# Patient Record
Sex: FEMALE | Race: WHITE | HISPANIC OR LATINO
[De-identification: names, ages, dates, MRNs, and addresses within clinical notes are randomized per-mention and may not be internally consistent; named-entity substitution may affect disease eponyms.]

---

## 2017-12-26 ENCOUNTER — RESULT REVIEW (OUTPATIENT)
Age: 47
End: 2017-12-26

## 2018-05-23 PROBLEM — Z00.00 ENCOUNTER FOR PREVENTIVE HEALTH EXAMINATION: Status: ACTIVE | Noted: 2018-05-23

## 2018-06-19 ENCOUNTER — APPOINTMENT (OUTPATIENT)
Dept: SURGERY | Facility: CLINIC | Age: 48
End: 2018-06-19
Payer: MEDICAID

## 2018-06-19 VITALS
BODY MASS INDEX: 26.68 KG/M2 | SYSTOLIC BLOOD PRESSURE: 127 MMHG | HEIGHT: 62 IN | WEIGHT: 145 LBS | DIASTOLIC BLOOD PRESSURE: 85 MMHG | OXYGEN SATURATION: 98 % | TEMPERATURE: 98.8 F | HEART RATE: 78 BPM

## 2018-06-19 PROCEDURE — 99204 OFFICE O/P NEW MOD 45 MIN: CPT

## 2018-07-10 ENCOUNTER — APPOINTMENT (OUTPATIENT)
Dept: SURGERY | Facility: CLINIC | Age: 48
End: 2018-07-10

## 2018-08-13 ENCOUNTER — APPOINTMENT (OUTPATIENT)
Dept: SURGERY | Facility: CLINIC | Age: 48
End: 2018-08-13

## 2018-12-19 ENCOUNTER — RECORD ABSTRACTING (OUTPATIENT)
Age: 48
End: 2018-12-19

## 2018-12-19 DIAGNOSIS — Z30.9 ENCOUNTER FOR CONTRACEPTIVE MANAGEMENT, UNSPECIFIED: ICD-10-CM

## 2018-12-19 DIAGNOSIS — N63.0 UNSPECIFIED LUMP IN UNSPECIFIED BREAST: ICD-10-CM

## 2018-12-19 DIAGNOSIS — Z82.49 FAMILY HISTORY OF ISCHEMIC HEART DISEASE AND OTHER DISEASES OF THE CIRCULATORY SYSTEM: ICD-10-CM

## 2018-12-19 DIAGNOSIS — Z78.9 OTHER SPECIFIED HEALTH STATUS: ICD-10-CM

## 2018-12-19 DIAGNOSIS — F17.200 NICOTINE DEPENDENCE, UNSPECIFIED, UNCOMPLICATED: ICD-10-CM

## 2018-12-19 LAB — CYTOLOGY CVX/VAG DOC THIN PREP: NORMAL

## 2018-12-19 RX ORDER — NICOTINE POLACRILEX 4 MG
250 GUM BUCCAL
Refills: 0 | Status: ACTIVE | COMMUNITY

## 2018-12-19 RX ORDER — UBIDECARENONE 200 MG
500 CAPSULE ORAL
Refills: 0 | Status: ACTIVE | COMMUNITY

## 2019-01-08 ENCOUNTER — APPOINTMENT (OUTPATIENT)
Dept: OBGYN | Facility: CLINIC | Age: 49
End: 2019-01-08

## 2019-02-11 ENCOUNTER — APPOINTMENT (OUTPATIENT)
Dept: OBGYN | Facility: CLINIC | Age: 49
End: 2019-02-11
Payer: MEDICAID

## 2019-02-11 VITALS
BODY MASS INDEX: 27.42 KG/M2 | SYSTOLIC BLOOD PRESSURE: 112 MMHG | WEIGHT: 149 LBS | HEIGHT: 62 IN | DIASTOLIC BLOOD PRESSURE: 78 MMHG

## 2019-02-11 DIAGNOSIS — N92.1 EXCESSIVE AND FREQUENT MENSTRUATION WITH IRREGULAR CYCLE: ICD-10-CM

## 2019-02-11 DIAGNOSIS — R92.8 OTHER ABNORMAL AND INCONCLUSIVE FINDINGS ON DIAGNOSTIC IMAGING OF BREAST: ICD-10-CM

## 2019-02-11 LAB
BILIRUB UR QL STRIP: NORMAL
CLARITY UR: CLEAR
COLLECTION METHOD: NORMAL
GLUCOSE UR-MCNC: NORMAL
HCG UR QL: 0.2 EU/DL
HGB UR QL STRIP.AUTO: NORMAL
KETONES UR-MCNC: NORMAL
LEUKOCYTE ESTERASE UR QL STRIP: NORMAL
NITRITE UR QL STRIP: NORMAL
PH UR STRIP: 5.5
PROT UR STRIP-MCNC: NORMAL
SP GR UR STRIP: 1.01

## 2019-02-11 PROCEDURE — 36415 COLL VENOUS BLD VENIPUNCTURE: CPT

## 2019-02-11 PROCEDURE — 99396 PREV VISIT EST AGE 40-64: CPT

## 2019-02-11 PROCEDURE — 81003 URINALYSIS AUTO W/O SCOPE: CPT | Mod: QW

## 2019-02-11 NOTE — PHYSICAL EXAM
[Awake] : awake [Alert] : alert [Acute Distress] : no acute distress [Mass] : no breast mass [Nipple Discharge] : no nipple discharge [Axillary LAD] : no axillary lymphadenopathy [Soft] : soft [Tender] : non tender [Distended] : not distended [Oriented x3] : oriented to person, place, and time [Depressed Mood] : not depressed [Flat Affect] : affect not flat [Normal] : uterus [IUD String] : had an IUD string protruding out

## 2019-02-11 NOTE — REVIEW OF SYSTEMS
[Nl] : Integumentary [Fever] : no fever [Chills] : no chills [Chest Pain] : no chest pain [Dyspnea] : no shortness of breath [Abdominal Pain] : no abdominal pain [Abn Vag Bleeding] : no abnormal vaginal bleeding

## 2019-02-11 NOTE — HISTORY OF PRESENT ILLNESS
[1 Year Ago] : 1 year ago [Last Pap ___] : Last cervical pap smear was [unfilled] [Last Pap Smear ___] : last Papanicolaou cytology done [unfilled] [Last Mammogram ___] : last mammogram done [unfilled] [Definite:  ___ (Date)] : the last menstrual period was [unfilled] [Menarche Age: ____] : age at menarche was [unfilled] [Sexually Active] : is sexually active [Contraception] : uses contraception [IUD] : has an intrauterine device [Male ___] : [unfilled] male [No] : no

## 2019-02-13 LAB
FSH SERPL-MCNC: 32.5 IU/L
HPV HIGH+LOW RISK DNA PNL CVX: NOT DETECTED

## 2019-02-14 LAB — CYTOLOGY CVX/VAG DOC THIN PREP: NORMAL

## 2019-12-20 ENCOUNTER — APPOINTMENT (OUTPATIENT)
Dept: OBGYN | Facility: CLINIC | Age: 49
End: 2019-12-20
Payer: MEDICAID

## 2019-12-20 VITALS
HEIGHT: 62 IN | DIASTOLIC BLOOD PRESSURE: 70 MMHG | WEIGHT: 148 LBS | SYSTOLIC BLOOD PRESSURE: 122 MMHG | BODY MASS INDEX: 27.23 KG/M2

## 2019-12-20 DIAGNOSIS — Z97.5 PRESENCE OF (INTRAUTERINE) CONTRACEPTIVE DEVICE: ICD-10-CM

## 2019-12-20 LAB
HCG UR QL: NEGATIVE
QUALITY CONTROL: YES

## 2019-12-20 PROCEDURE — 58301 REMOVE INTRAUTERINE DEVICE: CPT

## 2019-12-20 PROCEDURE — 81025 URINE PREGNANCY TEST: CPT

## 2019-12-20 NOTE — PROCEDURE
[IUD Removal] : IUD [Paraguard] : Bran [ IUD] :  IUD [Speculum Placed] : a speculum was placed in the vagina [Strings Exposed - Forceps] : forceps were placed in the endocervical canal to expose the strings [IUD Discarded] : discarded [Tolerated Well] : the patient tolerated the procedure well [No Complications] : none

## 2019-12-20 NOTE — HISTORY OF PRESENT ILLNESS
[Last Mammogram ___] : Last Mammogram was [unfilled] [Last Pap ___] : Last cervical pap smear was [unfilled] [Perimenopausal] : is perimenopausal [unknown] : the patient is unsure of the date of her LMP [Menarche Age: ____] : age at menarche was [unfilled] [Sexually Active] : is sexually active [Male ___] : [unfilled] male [NA] : N/A [de-identified] : ELENA 7/9/2018 - BR 2 [Contraception] : does not use contraception

## 2019-12-20 NOTE — REVIEW OF SYSTEMS
[Nl] : Hematologic/Lymphatic [Pelvic Pain] : no pelvic pain [Abn Vag Bleeding] : no abnormal vaginal bleeding

## 2020-03-12 ENCOUNTER — APPOINTMENT (OUTPATIENT)
Dept: OBGYN | Facility: CLINIC | Age: 50
End: 2020-03-12

## 2023-01-31 ENCOUNTER — OFFICE (OUTPATIENT)
Dept: URBAN - METROPOLITAN AREA CLINIC 103 | Facility: CLINIC | Age: 53
Setting detail: OPHTHALMOLOGY
End: 2023-01-31
Payer: COMMERCIAL

## 2023-01-31 DIAGNOSIS — H52.7: ICD-10-CM

## 2023-01-31 DIAGNOSIS — H25.13: ICD-10-CM

## 2023-01-31 DIAGNOSIS — H40.033: ICD-10-CM

## 2023-01-31 PROCEDURE — 92020 GONIOSCOPY: CPT | Performed by: OPHTHALMOLOGY

## 2023-01-31 PROCEDURE — 92015 DETERMINE REFRACTIVE STATE: CPT | Performed by: OPHTHALMOLOGY

## 2023-01-31 PROCEDURE — 92014 COMPRE OPH EXAM EST PT 1/>: CPT | Performed by: OPHTHALMOLOGY

## 2023-01-31 ASSESSMENT — REFRACTION_CURRENTRX
OD_AXIS: 165
OD_VPRISM_DIRECTION: PROGS
OS_ADD: +2.75
OS_OVR_VA: 20/
OD_CYLINDER: -1.00
OS_AXIS: 034
OD_OVR_VA: 20/
OS_AXIS: 026
OS_ADD: +2.50
OS_SPHERE: +1.75
OD_ADD: +2.50
OS_CYLINDER: -1.00
OS_CYLINDER: -1.25
OD_ADD: +2.75
OD_CYLINDER: -1.00
OD_OVR_VA: 20/
OS_OVR_VA: 20/
OS_VPRISM_DIRECTION: PROGS
OS_SPHERE: +1.75
OD_SPHERE: +1.75
OD_SPHERE: +1.75
OD_AXIS: 168

## 2023-01-31 ASSESSMENT — REFRACTION_AUTOREFRACTION
OD_SPHERE: +1.75
OD_CYLINDER: -0.75
OS_SPHERE: +2.00
OS_CYLINDER: -1.00
OD_AXIS: 158
OS_AXIS: 023

## 2023-01-31 ASSESSMENT — REFRACTION_MANIFEST
OS_ADD: +2.25
OD_SPHERE: +1.75
OS_CYLINDER: -1.25
OS_CYLINDER: -1.00
OS_SPHERE: +1.75
OD_CYLINDER: -1.00
OS_ADD: +2.50
OD_AXIS: 170
OS_AXIS: 023
OS_AXIS: 030
OD_CYLINDER: -1.00
OD_VA1: 20/20
OD_ADD: +2.25
OS_VA1: 20/20
OS_VA1: 20/20-
OD_AXIS: 154
OD_ADD: +2.50
OD_SPHERE: +1.75
OD_VA1: 20/20
OS_SPHERE: +1.75

## 2023-01-31 ASSESSMENT — SPHEQUIV_DERIVED
OS_SPHEQUIV: 1.5
OS_SPHEQUIV: 1.25
OS_SPHEQUIV: 1.125
OD_SPHEQUIV: 1.25
OD_SPHEQUIV: 1.375
OD_SPHEQUIV: 1.25

## 2023-01-31 ASSESSMENT — CONFRONTATIONAL VISUAL FIELD TEST (CVF)
OS_FINDINGS: FULL
OD_FINDINGS: FULL

## 2023-01-31 ASSESSMENT — TONOMETRY
OD_IOP_MMHG: 15
OS_IOP_MMHG: 15

## 2023-06-01 ENCOUNTER — APPOINTMENT (OUTPATIENT)
Dept: OBGYN | Facility: CLINIC | Age: 53
End: 2023-06-01

## 2023-08-22 ENCOUNTER — NON-APPOINTMENT (OUTPATIENT)
Age: 53
End: 2023-08-22

## 2023-08-22 ENCOUNTER — APPOINTMENT (OUTPATIENT)
Dept: OBGYN | Facility: CLINIC | Age: 53
End: 2023-08-22
Payer: MEDICAID

## 2023-08-22 VITALS
WEIGHT: 147 LBS | SYSTOLIC BLOOD PRESSURE: 122 MMHG | BODY MASS INDEX: 27.05 KG/M2 | HEIGHT: 62 IN | DIASTOLIC BLOOD PRESSURE: 78 MMHG

## 2023-08-22 DIAGNOSIS — Z12.39 ENCOUNTER FOR OTHER SCREENING FOR MALIGNANT NEOPLASM OF BREAST: ICD-10-CM

## 2023-08-22 DIAGNOSIS — Z11.3 ENCOUNTER FOR SCREENING FOR INFECTIONS WITH A PREDOMINANTLY SEXUAL MODE OF TRANSMISSION: ICD-10-CM

## 2023-08-22 DIAGNOSIS — Z12.4 ENCOUNTER FOR SCREENING FOR MALIGNANT NEOPLASM OF CERVIX: ICD-10-CM

## 2023-08-22 DIAGNOSIS — Z01.419 ENCOUNTER FOR GYNECOLOGICAL EXAMINATION (GENERAL) (ROUTINE) W/OUT ABNORMAL FINDINGS: ICD-10-CM

## 2023-08-22 DIAGNOSIS — Z11.51 ENCOUNTER FOR SCREENING FOR HUMAN PAPILLOMAVIRUS (HPV): ICD-10-CM

## 2023-08-22 PROCEDURE — 99396 PREV VISIT EST AGE 40-64: CPT

## 2023-08-22 PROCEDURE — 36415 COLL VENOUS BLD VENIPUNCTURE: CPT

## 2023-08-24 ENCOUNTER — NON-APPOINTMENT (OUTPATIENT)
Age: 53
End: 2023-08-24

## 2023-08-24 LAB
C TRACH RRNA SPEC QL NAA+PROBE: NOT DETECTED
HBV SURFACE AG SER QL: NONREACTIVE
HCV AB SER QL: NONREACTIVE
HCV S/CO RATIO: 0.09 S/CO
HIV1+2 AB SPEC QL IA.RAPID: NONREACTIVE
HPV HIGH+LOW RISK DNA PNL CVX: NOT DETECTED
N GONORRHOEA RRNA SPEC QL NAA+PROBE: NOT DETECTED
SOURCE AMPLIFICATION: NORMAL
SOURCE TP AMPLIFICATION: NORMAL
T PALLIDUM AB SER QL IA: NEGATIVE
T VAGINALIS RRNA SPEC QL NAA+PROBE: NOT DETECTED

## 2023-08-27 NOTE — HISTORY OF PRESENT ILLNESS
[LMP unknown] : LMP unknown [postmenopausal] : postmenopausal [N] : Patient does not use contraception [Y] : Positive pregnancy history [Menarche Age: ____] : age at menarche was [unfilled] [No] : Patient does not have concerns regarding sex [Currently Active] : currently active [Mammogramdate] : 05/08/2023 [TextBox_19] : BR2 [BreastSonogramDate] : 05/05/2021 [TextBox_25] : BR2 LT  [PapSmeardate] : 02/11/19 [TextBox_31] : NEG  [ColonoscopyDate] : 5YRS AGO [TextBox_43] : per pt to return in 10yrs  [HPVDate] : 02/11/19 [TextBox_78] : NEG  [LMPDate] : 01/01/2019 [PGHxTotal] : 3 [[facility] [[facility] [FreeTextEntry1] : 01/01/2019

## 2023-08-27 NOTE — PHYSICAL EXAM
[Chaperone Present] : A chaperone was present in the examining room during all aspects of the physical examination [FreeTextEntry1] : ISHAAN [Appropriately responsive] : appropriately responsive [Alert] : alert [No Acute Distress] : no acute distress [No Lymphadenopathy] : no lymphadenopathy [Soft] : soft [Non-tender] : non-tender [Non-distended] : non-distended [Oriented x3] : oriented x3 [FreeTextEntry3] : mobile thyroid, no masses, no nodules [FreeTextEntry7] : abdominoplasty  [FreeTextEntry6] : No cervical or axillary lymphadenopathy. [Examination Of The Breasts] : a normal appearance [No Masses] : no breast masses were palpable [Labia Majora] : normal [Labia Minora] : normal [Normal] : normal [Uterine Adnexae] : normal

## 2023-08-28 ENCOUNTER — NON-APPOINTMENT (OUTPATIENT)
Age: 53
End: 2023-08-28

## 2023-08-30 LAB — CYTOLOGY CVX/VAG DOC THIN PREP: ABNORMAL

## 2024-03-05 ENCOUNTER — OFFICE (OUTPATIENT)
Dept: URBAN - METROPOLITAN AREA CLINIC 103 | Facility: CLINIC | Age: 54
Setting detail: OPHTHALMOLOGY
End: 2024-03-05
Payer: COMMERCIAL

## 2024-03-05 DIAGNOSIS — H40.033: ICD-10-CM

## 2024-03-05 DIAGNOSIS — H11.152: ICD-10-CM

## 2024-03-05 DIAGNOSIS — H25.13: ICD-10-CM

## 2024-03-05 DIAGNOSIS — H52.7: ICD-10-CM

## 2024-03-05 PROCEDURE — 92014 COMPRE OPH EXAM EST PT 1/>: CPT | Performed by: OPHTHALMOLOGY

## 2024-03-05 PROCEDURE — 92015 DETERMINE REFRACTIVE STATE: CPT | Performed by: OPHTHALMOLOGY

## 2024-03-05 ASSESSMENT — REFRACTION_MANIFEST
OS_ADD: +2.25
OD_ADD: +2.25
OD_VA1: 20/20
OS_CYLINDER: -1.00
OS_VA1: 20/20
OD_AXIS: 160
OS_AXIS: 023
OS_SPHERE: +1.75
OS_SPHERE: +2.25
OD_AXIS: 154
OD_CYLINDER: -1.00
OD_CYLINDER: -1.00
OD_SPHERE: +1.75
OD_ADD: +2.25
OD_VA1: 20/25-
OS_AXIS: 035
OS_CYLINDER: -1.00
OD_SPHERE: +2.00
OS_ADD: +2.25

## 2024-03-05 ASSESSMENT — SPHEQUIV_DERIVED
OD_SPHEQUIV: 1.5
OD_SPHEQUIV: 1.25
OS_SPHEQUIV: 1.75
OS_SPHEQUIV: 1.25

## 2024-03-05 ASSESSMENT — REFRACTION_CURRENTRX
OS_OVR_VA: 20/
OS_VPRISM_DIRECTION: PROGS
OD_OVR_VA: 20/
OD_ADD: +2.50
OS_SPHERE: +1.75
OS_AXIS: 026
OD_CYLINDER: -1.00
OD_OVR_VA: 20/
OD_ADD: +2.25
OD_CYLINDER: -1.00
OD_VPRISM_DIRECTION: PROGS
OS_CYLINDER: -1.00
OS_SPHERE: +1.75
OS_CYLINDER: -1.00
OD_AXIS: 168
OD_SPHERE: +1.75
OS_OVR_VA: 20/
OD_SPHERE: +1.75
OS_AXIS: 019
OD_AXIS: 155
OS_ADD: +2.50
OS_ADD: +2.25

## 2024-08-26 ENCOUNTER — APPOINTMENT (OUTPATIENT)
Dept: OBGYN | Facility: CLINIC | Age: 54
End: 2024-08-26

## 2025-02-11 ENCOUNTER — APPOINTMENT (OUTPATIENT)
Dept: OBGYN | Facility: CLINIC | Age: 55
End: 2025-02-11
Payer: COMMERCIAL

## 2025-02-11 VITALS
HEIGHT: 62 IN | DIASTOLIC BLOOD PRESSURE: 78 MMHG | BODY MASS INDEX: 27.6 KG/M2 | WEIGHT: 150 LBS | SYSTOLIC BLOOD PRESSURE: 120 MMHG

## 2025-02-11 DIAGNOSIS — Z01.419 ENCOUNTER FOR GYNECOLOGICAL EXAMINATION (GENERAL) (ROUTINE) W/OUT ABNORMAL FINDINGS: ICD-10-CM

## 2025-02-11 DIAGNOSIS — R92.8 OTHER ABNORMAL AND INCONCLUSIVE FINDINGS ON DIAGNOSTIC IMAGING OF BREAST: ICD-10-CM

## 2025-02-11 DIAGNOSIS — Z12.39 ENCOUNTER FOR OTHER SCREENING FOR MALIGNANT NEOPLASM OF BREAST: ICD-10-CM

## 2025-02-11 PROCEDURE — 99459 PELVIC EXAMINATION: CPT

## 2025-02-11 PROCEDURE — 99386 PREV VISIT NEW AGE 40-64: CPT

## 2025-03-17 ENCOUNTER — NON-APPOINTMENT (OUTPATIENT)
Age: 55
End: 2025-03-17

## 2025-05-06 ENCOUNTER — OFFICE (OUTPATIENT)
Dept: URBAN - METROPOLITAN AREA CLINIC 103 | Facility: CLINIC | Age: 55
Setting detail: OPHTHALMOLOGY
End: 2025-05-06
Payer: COMMERCIAL

## 2025-05-06 DIAGNOSIS — H40.033: ICD-10-CM

## 2025-05-06 DIAGNOSIS — H25.13: ICD-10-CM

## 2025-05-06 DIAGNOSIS — H11.152: ICD-10-CM

## 2025-05-06 PROCEDURE — 92012 INTRM OPH EXAM EST PATIENT: CPT | Performed by: OPHTHALMOLOGY

## 2025-05-06 ASSESSMENT — TONOMETRY
OS_IOP_MMHG: 14
OD_IOP_MMHG: 17

## 2025-05-06 ASSESSMENT — REFRACTION_CURRENTRX
OS_SPHERE: +1.75
OD_OVR_VA: 20/
OS_OVR_VA: 20/
OS_CYLINDER: -1.00
OD_CYLINDER: -1.00
OD_SPHERE: +1.75
OS_SPHERE: +1.75
OS_AXIS: 019
OS_CYLINDER: -1.00
OS_AXIS: 026
OD_ADD: +2.25
OD_CYLINDER: -1.00
OS_OVR_VA: 20/
OD_OVR_VA: 20/
OD_VPRISM_DIRECTION: PROGS
OS_VPRISM_DIRECTION: PROGS
OD_AXIS: 168
OD_ADD: +2.50
OD_SPHERE: +1.75
OD_ADD: +2.25
OD_SPHERE: +2.00
OS_CYLINDER: -1.00
OD_OVR_VA: 20/
OD_CYLINDER: -1.00
OD_AXIS: 155
OS_OVR_VA: 20/
OS_ADD: +2.25
OS_ADD: +2.50
OS_AXIS: 035
OD_AXIS: 161
OD_VPRISM_DIRECTION: PROGS
OS_ADD: +2.25
OS_VPRISM_DIRECTION: PROGS
OS_SPHERE: +2.25

## 2025-05-06 ASSESSMENT — REFRACTION_MANIFEST
OD_VA1: 20/20
OD_AXIS: 154
OD_VA1: 20/25-
OS_VA1: 20/20
OS_ADD: +2.25
OD_ADD: +2.25
OS_AXIS: 023
OD_SPHERE: +1.75
OS_CYLINDER: -1.00
OD_CYLINDER: -1.00
OD_ADD: +2.25
OD_CYLINDER: -1.00
OD_AXIS: 160
OS_ADD: +2.25
OS_CYLINDER: -1.00
OS_AXIS: 035
OS_SPHERE: +1.75
OD_SPHERE: +2.00
OS_SPHERE: +2.25

## 2025-05-06 ASSESSMENT — KERATOMETRY
OS_K1POWER_DIOPTERS: 41.00
OS_K2POWER_DIOPTERS: 42.50
OD_K1POWER_DIOPTERS: 414.25
OD_AXISANGLE_DEGREES: 079
OD_K2POWER_DIOPTERS: 16943
OS_AXISANGLE_DEGREES: 105

## 2025-05-06 ASSESSMENT — REFRACTION_AUTOREFRACTION
OS_AXIS: 038
OS_SPHERE: +2.25
OD_AXIS: 152
OD_CYLINDER: -1.00
OS_CYLINDER: -0.75
OD_SPHERE: +2.00

## 2025-05-06 ASSESSMENT — VISUAL ACUITY
OS_BCVA: 20/25
OD_BCVA: 20/20-1